# Patient Record
Sex: FEMALE | Race: OTHER | HISPANIC OR LATINO | URBAN - METROPOLITAN AREA
[De-identification: names, ages, dates, MRNs, and addresses within clinical notes are randomized per-mention and may not be internally consistent; named-entity substitution may affect disease eponyms.]

---

## 2022-12-14 ENCOUNTER — EMERGENCY (EMERGENCY)
Facility: HOSPITAL | Age: 22
LOS: 1 days | Discharge: HOME | End: 2022-12-14
Attending: STUDENT IN AN ORGANIZED HEALTH CARE EDUCATION/TRAINING PROGRAM | Admitting: STUDENT IN AN ORGANIZED HEALTH CARE EDUCATION/TRAINING PROGRAM

## 2022-12-14 VITALS
HEART RATE: 85 BPM | OXYGEN SATURATION: 100 % | RESPIRATION RATE: 16 BRPM | SYSTOLIC BLOOD PRESSURE: 114 MMHG | DIASTOLIC BLOOD PRESSURE: 78 MMHG | TEMPERATURE: 99 F

## 2022-12-14 DIAGNOSIS — F32.A DEPRESSION, UNSPECIFIED: ICD-10-CM

## 2022-12-14 PROCEDURE — 99284 EMERGENCY DEPT VISIT MOD MDM: CPT

## 2022-12-14 PROCEDURE — 90792 PSYCH DIAG EVAL W/MED SRVCS: CPT | Mod: GC
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
## 2022-12-14 NOTE — ED BEHAVIORAL HEALTH ASSESSMENT NOTE - DETAILS
mother - depression; father - depression/bipolar disorder; both have been hospitalized; maternal uncle-  by suicide before she was born rash from [employer] in chart discussed with ED team as per HPI has suicidal thoughts last week with no intent or plan, denies now
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
## 2022-12-14 NOTE — ED BEHAVIORAL HEALTH ASSESSMENT NOTE - RISK ASSESSMENT
Risk factors: +fam h/o suicidality, recent loss (bf), not receiving treatment, hopelessness/depressed mood    Protective factors: no current SIIP/HIIP, no h/o SA/SIB, no h/o psych admissions, no access to weapons, no active substance abuse, good physical health, no psychosis, engaged in work and school, domiciled, social supports, help-seeking behaviors    Overall, pt is a low risk of harm to self/others and does not meet criteria for psychiatric admission.

## 2022-12-14 NOTE — ED BEHAVIORAL HEALTH ASSESSMENT NOTE - NSBHMSEREMMEM_PSY_A_CORE
Alk phos 367, AST/ALT normal.  GGT wnl, unlikely to be liver origin.  Ddx includes malignancy with bone infiltration.  Has been up-trending since 9/19 from 136 > 287 >323 >367.   - Calcium normal  - Vit D,25-OH and PTH WNL. Normal

## 2022-12-14 NOTE — ED BEHAVIORAL HEALTH ASSESSMENT NOTE - NSBHMSETHTPROC_PSY_A_CORE
Please contact patient Saturday morning to make sure that he is doing better.
Linear/Normal reasoning
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
## 2022-12-14 NOTE — ED BEHAVIORAL HEALTH ASSESSMENT NOTE - DESCRIPTION
none lives with family, full-time student, works part-time at [employer], recent breakup with bf calm and cooperative in the ED    ICU Vital Signs Last 24 Hrs  T(C): 37.1 (14 Dec 2022 08:50), Max: 37.1 (14 Dec 2022 08:50)  T(F): 98.7 (14 Dec 2022 08:50), Max: 98.7 (14 Dec 2022 08:50)  HR: 85 (14 Dec 2022 08:50) (85 - 85)  BP: 114/78 (14 Dec 2022 08:50) (114/78 - 114/78)  BP(mean): --  ABP: --  ABP(mean): --  RR: 16 (14 Dec 2022 08:50) (16 - 16)  SpO2: 100% (14 Dec 2022 08:50) (100% - 100%)

## 2022-12-14 NOTE — ED PROVIDER NOTE - PATIENT PORTAL LINK FT
You can access the FollowMyHealth Patient Portal offered by [facility] by registering at the following website: [email or web address]. By joining [employer]’s FollowMyHealth portal, you will also be able to view your health information using other applications (apps) compatible with our system.

## 2022-12-14 NOTE — ED ADULT NURSE NOTE - OBJECTIVE STATEMENT
Pt brought in by dad for psych eval. Pt states she has a hx of bipolar not on meds. Pt states she has been dealing with a break up, states she is depressed and had suicidal ideation  a week ago. Deneis current SI, no plan.

## 2022-12-14 NOTE — ED BEHAVIORAL HEALTH ASSESSMENT NOTE - SUMMARY
Pt is a 23yo F, domiciled with family, student at [employer], recently out of relationship, PPH of ?bipolar disorder and MDD, previously on Rispderal and Zoloft five years ago, not currently on medications, no previous inpatient psych admissions, no previous suicide attempts, no legal history, no NSSIB, no PMH, presenting today with worsening depression in the context of recent breakup. Pt presenting with symptoms of a major depressive episode in the context of a recent breakup with boyfriend and life stressors, with previous suicidal thoughts but none currently and no intent/plan/planning and no previous suicide attempts, not currently in acute danger to self/others but wanting connected with outpatient resources to help with depressive symptoms. At this time, pt not an acute risk to self/others and does not require inpatient psychiatric admission, but would benefit from outpatient referrals - appointment made at [facility] crisis clinic for tomorrow morning (12/15/22) at 10:30AM    Plan  -discharge with appointment at [facility] crisis clinic tomorrow 12/15/22 at 10:30 AM

## 2022-12-14 NOTE — ED PROVIDER NOTE - PHYSICAL EXAMINATION
General: well appearing, interactive, well nourished, no apparent distress, ncat  HEENT: EOMI, PERRLA, normal mucosa, normal oropharynx, no lesions on the lips or on oral mucosa, normal external ear  Neck: supple, no lymphadenopathy, full range of motion, no nuchal rigidity  CV: well perfused   Resp: non labored breathing   Abd: non-distended  MSK: full range of motion, no cyanosis, no edema, no clubbing, no immobility  Neuro: CN II-XII grossly intact, muscle strength 5/5 in all extremities, normal gait  Skin: no rashes, skin intact   psych: cooperative
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
## 2022-12-14 NOTE — ED PROVIDER NOTE - NSFOLLOWUPINSTRUCTIONS_ED_ALL_ED_FT
1) Please follow up with your Primary Care Provider in 24-48 hours  2) Seek immediate medical care for any new or returning symptoms including but not limited thoughts of wanting to hurt yourself and/or others

## 2022-12-14 NOTE — ED BEHAVIORAL HEALTH ASSESSMENT NOTE - OTHER PAST PSYCHIATRIC HISTORY (INCLUDE DETAILS REGARDING ONSET, COURSE OF ILLNESS, INPATIENT/OUTPATIENT TREATMENT)
as per HPI; previous hx of bipolar disorder in [facility], on Zoloft and Risperdal but does not think diagnosis is correct and has not been on medications since [place] 17; no previous IP admission

## 2022-12-14 NOTE — ED BEHAVIORAL HEALTH ASSESSMENT NOTE - NSBHATTESTCOMMENTATTENDFT_PSY_A_CORE
Pt is a 23yo F, domiciled with family, student at [employer], recently out of relationship, PPH of ?bipolar disorder and MDD, previously on Rispderal and Zoloft five years ago, not currently on medications, no previous inpatient psych admissions, no previous suicide attempts, no legal history, no NSSIB, no PMH, presenting today with worsening depression in the context of recent breakup. Pt presenting with symptoms of a major depressive episode in the context of a recent breakup with boyfriend and life stressors, with previous suicidal thoughts but none currently and no intent/plan/planning and no previous suicide attempts, not currently in acute danger to self/others but wanting connected with outpatient resources to help with depressive symptoms. At this time, pt not an acute risk to self/others and does not require inpatient psychiatric admission.

## 2022-12-14 NOTE — ED PROVIDER NOTE - NS ED ROS FT
GENERAL: No fever or chills, //             EYES: no change in vision, //             HEENT: no trouble swallowing or speaking, //             CARDIAC: no chest pain, //              PULMONARY: no cough or SOB, //             GI: no abdominal pain, no nausea or no vomiting, no diarrhea or constipation, //             : No changes in urination,  //            SKIN: no rashes,  //            NEURO: no headache,  //             MSK: No joint pain otherwise as HPI or negative.   // psych: no hi ~Mehdi Castillo,

## 2022-12-14 NOTE — ED ADULT TRIAGE NOTE - CHIEF COMPLAINT QUOTE
Pt brought in by dad for psych eval. Pt states she has a hx of bipolar not on meds. Pt states she has been dealing with a break up, states she is depressed and suicidal x1 week with no plan

## 2022-12-15 ENCOUNTER — OUTPATIENT (OUTPATIENT)
Dept: OUTPATIENT SERVICES | Facility: HOSPITAL | Age: 22
LOS: 1 days | Discharge: STILL ADMITTED OR ADMINISTRATIVE | End: 2022-12-15
Payer: MEDICAID

## 2022-12-16 PROCEDURE — 90839 PSYTX CRISIS INITIAL 60 MIN: CPT | Mod: 95

## 2022-12-16 NOTE — ED BEHAVIORAL HEALTH NOTE - BEHAVIORAL HEALTH NOTE
High Risk Log; Writer called pt spoke to her who states she just got back from crisis clinic, was prescribed medication and will continue to follow up.

## 2023-01-06 PROCEDURE — 99214 OFFICE O/P EST MOD 30 MIN: CPT | Mod: 95

## 2023-01-10 DIAGNOSIS — F43.23 ADJUSTMENT DISORDER WITH MIXED ANXIETY AND DEPRESSED MOOD: ICD-10-CM

## 2023-01-17 ENCOUNTER — OUTPATIENT (OUTPATIENT)
Dept: OUTPATIENT SERVICES | Facility: HOSPITAL | Age: 23
LOS: 1 days | Discharge: HOME | End: 2023-01-17
Payer: MEDICAID

## 2023-01-18 DIAGNOSIS — F33.9 MAJOR DEPRESSIVE DISORDER, RECURRENT, UNSPECIFIED: ICD-10-CM

## 2024-09-13 PROCEDURE — 90836 PSYTX W PT W E/M 45 MIN: CPT

## 2024-09-13 PROCEDURE — 99214 OFFICE O/P EST MOD 30 MIN: CPT

## 2024-09-20 PROCEDURE — 99214 OFFICE O/P EST MOD 30 MIN: CPT

## 2024-09-20 PROCEDURE — 90836 PSYTX W PT W E/M 45 MIN: CPT

## 2024-09-23 PROCEDURE — 90836 PSYTX W PT W E/M 45 MIN: CPT

## 2024-09-23 PROCEDURE — 99214 OFFICE O/P EST MOD 30 MIN: CPT

## 2024-10-04 PROCEDURE — 90836 PSYTX W PT W E/M 45 MIN: CPT

## 2024-10-04 PROCEDURE — 99213 OFFICE O/P EST LOW 20 MIN: CPT

## 2024-10-09 PROCEDURE — 99214 OFFICE O/P EST MOD 30 MIN: CPT

## 2024-10-09 PROCEDURE — 90833 PSYTX W PT W E/M 30 MIN: CPT

## 2024-10-14 PROCEDURE — 90836 PSYTX W PT W E/M 45 MIN: CPT

## 2024-10-14 PROCEDURE — 99213 OFFICE O/P EST LOW 20 MIN: CPT

## 2024-10-21 PROCEDURE — 99213 OFFICE O/P EST LOW 20 MIN: CPT

## 2024-10-21 PROCEDURE — 90836 PSYTX W PT W E/M 45 MIN: CPT

## 2024-11-04 PROCEDURE — 90836 PSYTX W PT W E/M 45 MIN: CPT

## 2024-11-04 PROCEDURE — 99213 OFFICE O/P EST LOW 20 MIN: CPT

## 2024-11-07 ENCOUNTER — INPATIENT (INPATIENT)
Facility: HOSPITAL | Age: 24
LOS: 3 days | Discharge: HOME | End: 2024-11-11
Attending: STUDENT IN AN ORGANIZED HEALTH CARE EDUCATION/TRAINING PROGRAM | Admitting: STUDENT IN AN ORGANIZED HEALTH CARE EDUCATION/TRAINING PROGRAM
Payer: COMMERCIAL

## 2024-11-07 VITALS
HEART RATE: 94 BPM | SYSTOLIC BLOOD PRESSURE: 114 MMHG | TEMPERATURE: 98 F | DIASTOLIC BLOOD PRESSURE: 70 MMHG | RESPIRATION RATE: 16 BRPM | OXYGEN SATURATION: 97 % | WEIGHT: 169.98 LBS

## 2024-11-07 DIAGNOSIS — F10.90 ALCOHOL USE, UNSPECIFIED, UNCOMPLICATED: ICD-10-CM

## 2024-11-07 DIAGNOSIS — F32.9 MAJOR DEPRESSIVE DISORDER, SINGLE EPISODE, UNSPECIFIED: ICD-10-CM

## 2024-11-07 LAB
ALBUMIN SERPL ELPH-MCNC: 4.5 G/DL — SIGNIFICANT CHANGE UP (ref 3.3–5)
ALP SERPL-CCNC: 100 U/L — SIGNIFICANT CHANGE UP (ref 40–120)
ALT FLD-CCNC: 20 U/L — SIGNIFICANT CHANGE UP (ref 4–33)
ANION GAP SERPL CALC-SCNC: 15 MMOL/L — HIGH (ref 7–14)
APAP SERPL-MCNC: <10 UG/ML — LOW (ref 15–25)
AST SERPL-CCNC: 24 U/L — SIGNIFICANT CHANGE UP (ref 4–32)
BASOPHILS # BLD AUTO: 0.04 K/UL — SIGNIFICANT CHANGE UP (ref 0–0.2)
BASOPHILS NFR BLD AUTO: 0.6 % — SIGNIFICANT CHANGE UP (ref 0–2)
BILIRUB SERPL-MCNC: 0.2 MG/DL — SIGNIFICANT CHANGE UP (ref 0.2–1.2)
BUN SERPL-MCNC: 12 MG/DL — SIGNIFICANT CHANGE UP (ref 7–23)
CALCIUM SERPL-MCNC: 9.1 MG/DL — SIGNIFICANT CHANGE UP (ref 8.4–10.5)
CHLORIDE SERPL-SCNC: 103 MMOL/L — SIGNIFICANT CHANGE UP (ref 98–107)
CO2 SERPL-SCNC: 23 MMOL/L — SIGNIFICANT CHANGE UP (ref 22–31)
CREAT SERPL-MCNC: 0.78 MG/DL — SIGNIFICANT CHANGE UP (ref 0.5–1.3)
EGFR: 110 ML/MIN/1.73M2 — SIGNIFICANT CHANGE UP
EOSINOPHIL # BLD AUTO: 0.05 K/UL — SIGNIFICANT CHANGE UP (ref 0–0.5)
EOSINOPHIL NFR BLD AUTO: 0.7 % — SIGNIFICANT CHANGE UP (ref 0–6)
ETHANOL SERPL-MCNC: 134 MG/DL — HIGH
FLUAV AG NPH QL: SIGNIFICANT CHANGE UP
FLUBV AG NPH QL: SIGNIFICANT CHANGE UP
GLUCOSE SERPL-MCNC: 100 MG/DL — HIGH (ref 70–99)
HCG SERPL-ACNC: <1 MIU/ML — SIGNIFICANT CHANGE UP
HCT VFR BLD CALC: 36.7 % — SIGNIFICANT CHANGE UP (ref 34.5–45)
HGB BLD-MCNC: 12.4 G/DL — SIGNIFICANT CHANGE UP (ref 11.5–15.5)
IANC: 3.82 K/UL — SIGNIFICANT CHANGE UP (ref 1.8–7.4)
IMM GRANULOCYTES NFR BLD AUTO: 0.3 % — SIGNIFICANT CHANGE UP (ref 0–0.9)
LYMPHOCYTES # BLD AUTO: 2.32 K/UL — SIGNIFICANT CHANGE UP (ref 1–3.3)
LYMPHOCYTES # BLD AUTO: 33.5 % — SIGNIFICANT CHANGE UP (ref 13–44)
MCHC RBC-ENTMCNC: 30.2 PG — SIGNIFICANT CHANGE UP (ref 27–34)
MCHC RBC-ENTMCNC: 33.8 G/DL — SIGNIFICANT CHANGE UP (ref 32–36)
MCV RBC AUTO: 89.5 FL — SIGNIFICANT CHANGE UP (ref 80–100)
MONOCYTES # BLD AUTO: 0.67 K/UL — SIGNIFICANT CHANGE UP (ref 0–0.9)
MONOCYTES NFR BLD AUTO: 9.7 % — SIGNIFICANT CHANGE UP (ref 2–14)
NEUTROPHILS # BLD AUTO: 3.82 K/UL — SIGNIFICANT CHANGE UP (ref 1.8–7.4)
NEUTROPHILS NFR BLD AUTO: 55.2 % — SIGNIFICANT CHANGE UP (ref 43–77)
NRBC # BLD: 0 /100 WBCS — SIGNIFICANT CHANGE UP (ref 0–0)
NRBC # FLD: 0 K/UL — SIGNIFICANT CHANGE UP (ref 0–0)
PLATELET # BLD AUTO: 284 K/UL — SIGNIFICANT CHANGE UP (ref 150–400)
POTASSIUM SERPL-MCNC: 4.3 MMOL/L — SIGNIFICANT CHANGE UP (ref 3.5–5.3)
POTASSIUM SERPL-SCNC: 4.3 MMOL/L — SIGNIFICANT CHANGE UP (ref 3.5–5.3)
PROT SERPL-MCNC: 7.4 G/DL — SIGNIFICANT CHANGE UP (ref 6–8.3)
RBC # BLD: 4.1 M/UL — SIGNIFICANT CHANGE UP (ref 3.8–5.2)
RBC # FLD: 13.3 % — SIGNIFICANT CHANGE UP (ref 10.3–14.5)
RSV RNA NPH QL NAA+NON-PROBE: SIGNIFICANT CHANGE UP
SALICYLATES SERPL-MCNC: <0.3 MG/DL — LOW (ref 15–30)
SARS-COV-2 RNA SPEC QL NAA+PROBE: SIGNIFICANT CHANGE UP
SODIUM SERPL-SCNC: 141 MMOL/L — SIGNIFICANT CHANGE UP (ref 135–145)
TOXICOLOGY SCREEN, DRUGS OF ABUSE, SERUM RESULT: SIGNIFICANT CHANGE UP
TSH SERPL-MCNC: 2.93 UIU/ML — SIGNIFICANT CHANGE UP (ref 0.27–4.2)
WBC # BLD: 6.92 K/UL — SIGNIFICANT CHANGE UP (ref 3.8–10.5)
WBC # FLD AUTO: 6.92 K/UL — SIGNIFICANT CHANGE UP (ref 3.8–10.5)

## 2024-11-07 PROCEDURE — 99285 EMERGENCY DEPT VISIT HI MDM: CPT

## 2024-11-07 RX ORDER — HYDROXYZINE HCL 25 MG
25 TABLET ORAL EVERY 6 HOURS
Refills: 0 | Status: DISCONTINUED | OUTPATIENT
Start: 2024-11-07 | End: 2024-11-11

## 2024-11-07 RX ORDER — LORAZEPAM 2 MG
2 TABLET ORAL ONCE
Refills: 0 | Status: DISCONTINUED | OUTPATIENT
Start: 2024-11-07 | End: 2024-11-08

## 2024-11-07 RX ORDER — FLUOXETINE HCL 10 MG
40 CAPSULE ORAL DAILY
Refills: 0 | Status: DISCONTINUED | OUTPATIENT
Start: 2024-11-08 | End: 2024-11-11

## 2024-11-07 RX ORDER — LORAZEPAM 2 MG
2 TABLET ORAL
Refills: 0 | Status: DISCONTINUED | OUTPATIENT
Start: 2024-11-07 | End: 2024-11-11

## 2024-11-07 NOTE — ED BEHAVIORAL HEALTH ASSESSMENT NOTE - NSBHSAALC_PSY_A_CORE FT
drinks socially on weekends with friends drinks 3x per week- 2-4 drinks (1 shot of liquor in 8oz mixed drink)

## 2024-11-07 NOTE — ED PROVIDER NOTE - CONSTITUTIONAL, MLM
Patient notified and verbalized understanding. Patient had no questions or concerns at this time. Patient was transferred to PSR to schedule appointment with Dr. Aly    Closing encounter.    normal... Well appearing, awake, alert, oriented to person, place, time/situation and in no apparent distress.

## 2024-11-07 NOTE — ED PROVIDER NOTE - CLINICAL SUMMARY MEDICAL DECISION MAKING FREE TEXT BOX
25 y/o female with PMHx of Depression and SI who presented to the ED for worsening depression and SI today.   Given history and current presentation, concern for Depression with SI  Will get Psych eval and SW consult

## 2024-11-07 NOTE — ED ADULT NURSE NOTE - CHIEF COMPLAINT QUOTE
Patient came in with the complaints of suicidal ideation. Patient denies active plan. Patient stated she was admitted before for suicidal ideation in [facility]. Patient admits use of cocaine 2 hours prior to Arrival. Denies Hallucinations/Denies homicidal ideation. Denies any other past Medical history.

## 2024-11-07 NOTE — ED BEHAVIORAL HEALTH ASSESSMENT NOTE - VIOLENCE RISK FACTORS:
None Known Impulsivity/History of being victimized/traumatized/Community stressors that increase the risk of destabilization Substance abuse/History of being victimized/traumatized/Community stressors that increase the risk of destabilization

## 2024-11-07 NOTE — ED ADULT NURSE NOTE - OBJECTIVE STATEMENT
Patient came in with the complaints of suicidal ideation. Patient denies active plan. Patient stated she was admitted before for suicidal ideation in [facility]. Patient admits use of cocaine 2 hours prior to Arrival. Denies Hallucinations/Denies homicidal ideation. Denies any other past Medical history  Patient brought to  area for above complaints. Patient evaluated by medical provider. Labs drawn and sent. Patient evaluated by psychiatry. Patient is calm and cooperative. Will continue to monitor. Waiting for results and disposition.  WANDA Chester
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
## 2024-11-07 NOTE — ED BEHAVIORAL HEALTH ASSESSMENT NOTE - NSPRESENTSXS_PSY_ALL_CORE
Depressed mood/Anhedonia/Hopelessness or despair hypersomnia/Depressed mood/Anhedonia/Impulsivity/Hopelessness or despair
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
## 2024-11-07 NOTE — ED ADULT TRIAGE NOTE - CHIEF COMPLAINT QUOTE
Patient came in with the complaints of suicidal ideation. Patient denies active plan. Patient stated she was admitted before for suicidal ideation in [facility]. Patient admits use of cocaine 2 hours prior to Arrival. Denies Hallucinations/Denies homicidal ideation. Denies any other past Medical history.
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
## 2024-11-07 NOTE — ED BEHAVIORAL HEALTH ASSESSMENT NOTE - DESCRIPTION
none lives with family, full-time student, works part-time at [employer], recent breakup with bf calm and cooperative in the ED    ICU Vital Signs Last 24 Hrs  T(C): 37.1 (14 Dec 2022 08:50), Max: 37.1 (14 Dec 2022 08:50)  T(F): 98.7 (14 Dec 2022 08:50), Max: 98.7 (14 Dec 2022 08:50)  HR: 85 (14 Dec 2022 08:50) (85 - 85)  BP: 114/78 (14 Dec 2022 08:50) (114/78 - 114/78)  BP(mean): --  ABP: --  ABP(mean): --  RR: 16 (14 Dec 2022 08:50) (16 - 16)  SpO2: 100% (14 Dec 2022 08:50) (100% - 100%) During course of ED visit patient was calm and cooperative. Patient was not aggressive or violent and did not require PRN medications.     ICU Vital Signs Last 24 Hrs  T(C): 36.8 (07 Nov 2024 08:16), Max: 36.8 (07 Nov 2024 08:16)  T(F): 98.3 (07 Nov 2024 08:16), Max: 98.3 (07 Nov 2024 08:16)  HR: 94 (07 Nov 2024 08:16) (94 - 94)  BP: 114/70 (07 Nov 2024 08:16) (114/70 - 114/70)  BP(mean): --  ABP: --  ABP(mean): --  RR: 16 (07 Nov 2024 08:16) (16 - 16)  SpO2: 97% (07 Nov 2024 08:16) (97% - 97%)    O2 Parameters below as of 07 Nov 2024 08:16  Patient On (Oxygen Delivery Method): room air full time student at [employer],

## 2024-11-07 NOTE — ED BEHAVIORAL HEALTH ASSESSMENT NOTE - OTHER PAST PSYCHIATRIC HISTORY (INCLUDE DETAILS REGARDING ONSET, COURSE OF ILLNESS, INPATIENT/OUTPATIENT TREATMENT)
as per HPI; previous hx of bipolar disorder in [facility], on Zoloft and Risperdal but does not think diagnosis is correct and has not been on medications [place] 17; no previous IP admission P[facility] MDD. In AOPD at [facility] with Dr. Gale. Per chart review previous hx of bipolar disorder in [facility], on Zoloft and Risperdal . No history of inpatient admissions or suicide attempts.
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
## 2024-11-07 NOTE — ED BEHAVIORAL HEALTH ASSESSMENT NOTE - DETAILS
rash from Sulfur drugs discussed with ED team in chart as per HPI has suicidal thoughts last week with no intent or plan, denies now mother - depression; father - depression/bipolar disorder; both have been hospitalized; maternal uncle-  by suicide before she was born rash from Sulfa drugs see hpi bf 1S Dr. Crowell

## 2024-11-07 NOTE — ED BEHAVIORAL HEALTH NOTE - BEHAVIORAL HEALTH NOTE
Called TAVO CONTRERAS, patient's boyfriend, listed as her emergency contact.    He states yesterday patient was feeling happy and did not seem down at all. Last night at 2am patient got into a "really bad argument" with her father. Afterwards, she drove to the boyfriends house and was very upset. She did not tell him details of the argument. She told him she did not want to go home, wanted to go to the hospital, and "needed time alone". Patient told boyfriend if she went home she would hurt herself, so that is why she came into the hospital. He states she takes psychiatric medication, but does not know which medication or what psychiatric diagnosis she has.

## 2024-11-07 NOTE — BH PATIENT PROFILE - FALL HARM RISK - UNIVERSAL INTERVENTIONS
Bed in lowest position, wheels locked, appropriate side rails in place/Call bell, personal items and telephone in reach/Instruct patient to call for assistance before getting out of bed or chair/Non-slip footwear when patient is out of bed/[place] to call system/Physically safe environment - no spills, clutter or unnecessary equipment/Purposeful Proactive Rounding/Room/bathroom lighting operational, light cord in reach

## 2024-11-07 NOTE — ED BEHAVIORAL HEALTH ASSESSMENT NOTE - NSCURPASTPSYDX_PSY_ALL_CORE
Mood disorder Mood disorder/Alcohol/Substance Use disorders Mood disorder/Alcohol/Substance Use disorders/Cluster B Personality disorder/traits

## 2024-11-07 NOTE — ED BEHAVIORAL HEALTH ASSESSMENT NOTE - NSSUICPROTFACT_PSY_ALL_CORE
Responsibility to children, family, or others/Identifies reasons for living/Supportive social network of family or friends/Fear of death or the actual act of killing self/Engaged in work or school Identifies reasons for living/Engaged in work or school/Positive therapeutic relationships

## 2024-11-08 PROCEDURE — 99222 1ST HOSP IP/OBS MODERATE 55: CPT

## 2024-11-08 RX ORDER — ACETAMINOPHEN 500 MG
650 TABLET ORAL EVERY 6 HOURS
Refills: 0 | Status: DISCONTINUED | OUTPATIENT
Start: 2024-11-08 | End: 2024-11-11

## 2024-11-08 RX ORDER — MELATONIN 5 MG
3 TABLET ORAL AT BEDTIME
Refills: 0 | Status: DISCONTINUED | OUTPATIENT
Start: 2024-11-08 | End: 2024-11-11

## 2024-11-08 RX ORDER — LORAZEPAM 2 MG
1 TABLET ORAL ONCE
Refills: 0 | Status: DISCONTINUED | OUTPATIENT
Start: 2024-11-08 | End: 2024-11-11

## 2024-11-08 RX ORDER — MAGNESIUM, ALUMINUM HYDROXIDE 200-200 MG
30 TABLET,CHEWABLE ORAL EVERY 6 HOURS
Refills: 0 | Status: DISCONTINUED | OUTPATIENT
Start: 2024-11-08 | End: 2024-11-11

## 2024-11-08 RX ADMIN — Medication 40 MILLIGRAM(S): at 13:32

## 2024-11-08 RX ADMIN — Medication 3 MILLIGRAM(S): at 21:11
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
## 2024-11-08 NOTE — BH INPATIENT PSYCHIATRY ASSESSMENT NOTE - CURRENT MEDICATION
MEDICATIONS  (STANDING):  FLUoxetine 40 milliGRAM(s) Oral daily    MEDICATIONS  (PRN):  acetaminophen     Tablet .. 650 milliGRAM(s) Oral every 6 hours PRN Mild Pain (1 - 3), Moderate Pain (4 - 6)  aluminum hydroxide/magnesium hydroxide/simethicone Suspension 30 milliLiter(s) Oral every 6 hours PRN Dyspepsia  hydrOXYzine hydrochloride 25 milliGRAM(s) Oral every 6 hours PRN anxiety  LORazepam     Tablet 2 milliGRAM(s) Oral every 2 hours PRN CIWA score increase by 2 points and current CIWA score GREATER THAN 9  LORazepam   Injectable 1 milliGRAM(s) IntraMuscular once PRN Agitation  melatonin. 3 milliGRAM(s) Oral at bedtime PRN Insomnia
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
## 2024-11-08 NOTE — BH INPATIENT PSYCHIATRY ASSESSMENT NOTE - NSBHCHARTREVIEWVS_PSY_A_CORE FT
Vital Signs Last 24 Hrs  T(C): 36.5 (11-08-24 @ 20:19), Max: 36.5 (11-08-24 @ 08:10)  T(F): 97.7 (11-08-24 @ 20:19), Max: 97.7 (11-08-24 @ 08:10)  HR: --  BP: --  BP(mean): --  RR: --  SpO2: --    Orthostatic VS  11-08-24 @ 08:10  Lying BP: --/-- HR: --  Sitting BP: 110/61 HR: 72  Standing BP: 111/77 HR: 87  Site: --  Mode: --  Orthostatic VS  11-07-24 @ 12:56  Lying BP: --/-- HR: --  Sitting BP: 110/68 HR: 70  Standing BP: 112/70 HR: 74  Site: --  Mode: --

## 2024-11-08 NOTE — PSYCHIATRIC REHAB INITIAL EVALUATION - NSBHALCSUBCHOICE_PSY_ALL_CORE
Pt admitted she has been having few drinks per week.  Chart indicated, pt has been using ETOH and cocaine.
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
## 2024-11-08 NOTE — BH INPATIENT PSYCHIATRY ASSESSMENT NOTE - OTHER PAST PSYCHIATRIC HISTORY (INCLUDE DETAILS REGARDING ONSET, COURSE OF ILLNESS, INPATIENT/OUTPATIENT TREATMENT)
[facility] MDD. In AOPD at [facility] with Dr. Gale. Per chart review previous hx of bipolar disorder in [facility], on Zoloft and Risperdal . No history of inpatient admissions or suicide attempts.

## 2024-11-08 NOTE — PSYCHIATRIC REHAB INITIAL EVALUATION - NSBHSTRENGTHHOME_PSY_ALL_CORE
Pt stated she has been taking care of her hygiene, dose household chore. Pt reported she has good rapport with her mom and her boyfriend.

## 2024-11-08 NOTE — BH INPATIENT PSYCHIATRY ASSESSMENT NOTE - ADDITIONAL DETAILS ALL
CC:  Prince Molina is here today for office visit and establish care (new patient).    Medications: medications verified and updated  Refills needed today?  NO  Patient would like communication of their results via:   [facility]  Advanced directives: No          Health Maintenance Due   Topic Date Due   • Depression Screening  Never done   • DTaP/Tdap/Td Vaccine (1 - Tdap) Never done   • Shingles Vaccine (1 of 2) Never done   • Colorectal Cancer Screen-  Never done   • Influenza Vaccine (1) 09/01/2021     Patient is due for topics listed above, he wishes to proceed with Depression Screening , but is not proceeding with Immunization(s) Dtap/Tdap/Td, Influenza and Shingles and Colorectal Cancer Screening: Colonoscopy at this time. The following has occurred: to speak to provider.         Over the last 2 weeks, how often have you been bothered by the following problems?          PHQ2 Score: 0  PHQ2 Score Interpretation: No further screening needed  1. Little interest or pleasure in activity?: 0  2. Feeling down, depressed, or hopeless?: 0         DEPRESSION ASSESSMENT/PLAN:  Depression screening is negative no further plan needed.       denies

## 2024-11-08 NOTE — BH PSYCHOLOGY - CLINICIAN PSYCHOTHERAPY NOTE - TOKEN PULL-DIAGNOSIS
Primary Diagnosis:    Problem Dx:   Alcohol use disorder [F10.90]      MDD (major depressive disorder) [F32.9]

## 2024-11-08 NOTE — BH SAFETY PLAN - ENVIRONMENT SAFETY 3:
Talk to my mom  Work on managing my anger (communicating my anger, going outside and breathing when angry)

## 2024-11-08 NOTE — BH INPATIENT PSYCHIATRY ASSESSMENT NOTE - NSBHMETABOLIC_PSY_ALL_CORE_FT
BMI:   HbA1c:   Glucose:   BP: 102/58 (11-07-24 @ 12:19) (102/58 - 114/70)Vital Signs Last 24 Hrs  T(C): 36.5 (11-08-24 @ 20:19), Max: 36.5 (11-08-24 @ 08:10)  T(F): 97.7 (11-08-24 @ 20:19), Max: 97.7 (11-08-24 @ 08:10)  HR: --  BP: --  BP(mean): --  RR: --  SpO2: --    Orthostatic VS  11-08-24 @ 08:10  Lying BP: --/-- HR: --  Sitting BP: 110/61 HR: 72  Standing BP: 111/77 HR: 87  Site: --  Mode: --  Orthostatic VS  11-07-24 @ 12:56  Lying BP: --/-- HR: --  Sitting BP: 110/68 HR: 70  Standing BP: 112/70 HR: 74  Site: --  Mode: --    Lipid Panel:

## 2024-11-08 NOTE — BH SAFETY PLAN - LOCAL URGENT CARE NAME
[facility]  [facility]

## 2024-11-08 NOTE — BH SAFETY PLAN - LOCAL URGENT CARE ADDRESS
[phone] [place] [address], [place] [address] [phone] [place] [address], [place] [address]

## 2024-11-08 NOTE — BH INPATIENT PSYCHIATRY ASSESSMENT NOTE - DETAILS
see hpi mother - depression; father - depression/bipolar disorder; both have been hospitalized; maternal uncle-  by suicide before she was born rash from Sulf[employer] drugs as per HPI
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
## 2024-11-08 NOTE — BH TREATMENT PLAN - NSTXDEPRESINTERPR_PSY_ALL_CORE
Pt would benefit from identifying 2-3 coping skills to improve mood by day seven. Psychiatric rehabilitation staff has encouraged pt to attend daily symptom management group and participated in individual therapy session to achieve her goal.

## 2024-11-08 NOTE — BH SAFETY PLAN - THE ONE THING THAT IS MOST IMPORTANT TO ME AND WORTH LIVING FOR IS:
Graduate, get my master degree, eventually get my own apartment  My pets  My family, friends, and boyfriend  My degree  Getting my own apartment  Travel  Meeting more benson
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
## 2024-11-09 LAB
APPEARANCE UR: ABNORMAL
BACTERIA # UR AUTO: ABNORMAL /HPF
BILIRUB UR-MCNC: NEGATIVE — SIGNIFICANT CHANGE UP
CAST: 0 /LPF — SIGNIFICANT CHANGE UP (ref 0–4)
COLOR SPEC: YELLOW — SIGNIFICANT CHANGE UP
DIFF PNL FLD: NEGATIVE — SIGNIFICANT CHANGE UP
GLUCOSE UR QL: NEGATIVE MG/DL — SIGNIFICANT CHANGE UP
KETONES UR-MCNC: NEGATIVE MG/DL — SIGNIFICANT CHANGE UP
LEUKOCYTE ESTERASE UR-ACNC: ABNORMAL
NITRITE UR-MCNC: POSITIVE
PH UR: 7 — SIGNIFICANT CHANGE UP (ref 5–8)
PROT UR-MCNC: SIGNIFICANT CHANGE UP MG/DL
RBC CASTS # UR COMP ASSIST: 1 /HPF — SIGNIFICANT CHANGE UP (ref 0–4)
REVIEW: SIGNIFICANT CHANGE UP
SP GR SPEC: 1.02 — SIGNIFICANT CHANGE UP (ref 1–1.03)
SQUAMOUS # UR AUTO: 10 /HPF — HIGH (ref 0–5)
UROBILINOGEN FLD QL: 1 MG/DL — SIGNIFICANT CHANGE UP (ref 0.2–1)
WBC UR QL: 8 /HPF — HIGH (ref 0–5)

## 2024-11-09 PROCEDURE — 99231 SBSQ HOSP IP/OBS SF/LOW 25: CPT

## 2024-11-09 RX ADMIN — Medication 40 MILLIGRAM(S): at 15:07

## 2024-11-09 RX ADMIN — Medication 3 MILLIGRAM(S): at 21:27

## 2024-11-10 PROCEDURE — 99231 SBSQ HOSP IP/OBS SF/LOW 25: CPT

## 2024-11-10 RX ORDER — NITROFURANTOIN 25 MG/5ML
100 SUSPENSION ORAL
Refills: 0 | Status: DISCONTINUED | OUTPATIENT
Start: 2024-11-10 | End: 2024-11-11

## 2024-11-10 RX ORDER — MELATONIN 5 MG
1 TABLET ORAL
Qty: 0 | Refills: 0 | DISCHARGE
Start: 2024-11-10

## 2024-11-10 RX ORDER — FLUOXETINE HCL 10 MG
1 CAPSULE ORAL
Refills: 0 | DISCHARGE

## 2024-11-10 RX ORDER — FLUOXETINE HCL 10 MG
1 CAPSULE ORAL
Qty: 30 | Refills: 0
Start: 2024-11-10 | End: 2024-12-09

## 2024-11-10 RX ADMIN — NITROFURANTOIN 100 MILLIGRAM(S): 25 SUSPENSION ORAL at 21:09

## 2024-11-10 RX ADMIN — Medication 40 MILLIGRAM(S): at 10:08

## 2024-11-10 RX ADMIN — Medication 3 MILLIGRAM(S): at 21:57

## 2024-11-10 NOTE — BH INPATIENT PSYCHIATRY PROGRESS NOTE - NSBHFUPINTERVALCCFT_PSY_A_CORE
"I'm feeling better and motivated for my future"		
"I had a nice visit with my BF yesterday.. He says I need to come home soon because my cats are getting fat !"

## 2024-11-10 NOTE — BH INPATIENT PSYCHIATRY PROGRESS NOTE - NSBHFUPINTERVALHXFT_PSY_A_CORE
Patient seen for follow up of depression and SI  Chart reviewed and case discussed with Nursing Staff  Patient has an active 72 hour notice  Reports plan not to go home but to her BF of a few years who has been caring for her cats "Eric" and "Fiona" while she has been here  Denies any further suicidal ideation, intention or plan 
Patient seen for follow up of depression and SI  Chart reviewed and case discussed with Nursing Staff  Patient has an active 72 hour notice  Reports a great visit with her BF where she plans to go after her D/C  Feels she has benefited from a short time here  Denies any further suicidal ideation, intention or plan

## 2024-11-10 NOTE — BH INPATIENT PSYCHIATRY PROGRESS NOTE - CURRENT MEDICATION
MEDICATIONS  (STANDING):  FLUoxetine 40 milliGRAM(s) Oral daily    MEDICATIONS  (PRN):  acetaminophen     Tablet .. 650 milliGRAM(s) Oral every 6 hours PRN Mild Pain (1 - 3), Moderate Pain (4 - 6)  aluminum hydroxide/magnesium hydroxide/simethicone Suspension 30 milliLiter(s) Oral every 6 hours PRN Dyspepsia  hydrOXYzine hydrochloride 25 milliGRAM(s) Oral every 6 hours PRN anxiety  LORazepam     Tablet 2 milliGRAM(s) Oral every 2 hours PRN CIWA score increase by 2 points and current CIWA score GREATER THAN 9  LORazepam   Injectable 1 milliGRAM(s) IntraMuscular once PRN Agitation  melatonin. 3 milliGRAM(s) Oral at bedtime PRN Insomnia  
MEDICATIONS  (STANDING):  FLUoxetine 40 milliGRAM(s) Oral daily    MEDICATIONS  (PRN):  acetaminophen     Tablet .. 650 milliGRAM(s) Oral every 6 hours PRN Mild Pain (1 - 3), Moderate Pain (4 - 6)  aluminum hydroxide/magnesium hydroxide/simethicone Suspension 30 milliLiter(s) Oral every 6 hours PRN Dyspepsia  hydrOXYzine hydrochloride 25 milliGRAM(s) Oral every 6 hours PRN anxiety  LORazepam     Tablet 2 milliGRAM(s) Oral every 2 hours PRN CIWA score increase by 2 points and current CIWA score GREATER THAN 9  LORazepam   Injectable 1 milliGRAM(s) IntraMuscular once PRN Agitation  melatonin. 3 milliGRAM(s) Oral at bedtime PRN Insomnia

## 2024-11-10 NOTE — BH INPATIENT PSYCHIATRY DISCHARGE NOTE - HOSPITAL COURSE
Patient admitted to [address] [place] [address] 11/7/24 on a voluntary status  Submitted a 72 hour notice on 11/8 and discharged on that letter on 11/11/2024    Patient after admission quickly reported a flight into health and submitted a 72 hour notice   She was observed over 72 hours and was in good behavioral control and denies all hopelessness and suicidal ideation     On exam today the patient is generally cooperative and makes fair eye contact.   Speech is clear and of normal rate.  Thought process: with no disorder of thought process.   Thought content: with no evidence of delusional beliefs.   Perception: Denies hallucinations.  Mood: Describes as "improved"   Affect: flat.  Patient denies suicidal and aggressive ideation, intent and plan.   AAO X3. Cognitively grossly intact.   Insight and judgment are improved.  Impulse control is intact at this time    Suicide and risk assessment performed prior to discharge. The patient has a low acute risk and low chronic risk of self-harm and aggression towards others. Protective factors include denying SI, no SIB, denying HI, good social supports in their family, no current substance abuse, no current mood symptoms, no hopelessness, future-oriented in returning to home, no access to firearms.  Risk factors include presenting illness. Immediate risk was minimized by inpatient admission to a safe environment with appropriate supervision and limited access to lethal means. Future risk was minimized before discharge by treatment of acute episode, maximizing outpatient support, providing relevant patient education, discussing emergency procedures, and ensuring close follow-up. The patient remains at a low risk of self-harm, and such risk cannot be further ameliorated by continued inpatient treatment and the patient is therefore appropriate for discharge.       There were no behavioral problems on the unit.  Patient did not become agitated and did not require emergent intramuscular medications or seclusion / restraints.  Patient did not self-harm on the unit.  Patient remained actively engaged in treatment.  Patient participated in individual, group, and milieu therapy.  Patient got along appropriately with staff and peers. Patient did not have any medical problems during this hospitalization.  There were no medical consultations.    A full discussion of the factors that predict treatment success and relapse was held including safety planning.  A discussion of the risks and benefits of patient’s medication was held including a discussion of the risk of worsening of SI    On day of discharge, the patient no longer requires inpatient treatment and care. Patient denies all suicidal and aggressive ideation, intent and plan. Patient denies anxiety symptoms and panic attacks. Patient is not judged to be an acute danger to self or others at this time. Patient will be discharged on her 72 hour notice to home and outpatient follow up.
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
## 2024-11-10 NOTE — BH INPATIENT PSYCHIATRY DISCHARGE NOTE - MSE UNSTRUCTURED FT
On exam today the patient is generally cooperative with fair eye contact.    Speech is clear and of normal rate.    Thought process: with no evidence of a disorder of thought process.    Thought content: with no evidence of psychotic beliefs.  Perception: Denies hallucinations.    Mood: Describes as "better".  Affect: constricted.    Patient denies suicidal ideation, active intent and plan.    Patient denies active aggressive/homicidal ideation, intent or plan.   Patient is Alert and oriented in all spheres. Patient is cognitively grossly intact.   Insight and judgment are limited. Impulse control is intact at this time.    Vital signs are stable. Gait and station WNL
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
## 2024-11-10 NOTE — BH INPATIENT PSYCHIATRY DISCHARGE NOTE - NSDCMRMEDTOKEN_GEN_ALL_CORE_FT
PROzac 40 mg oral capsule: 1 cap(s) orally once a day   FLUoxetine 40 mg oral capsule: 1 cap(s) orally once a day  melatonin 3 mg oral tablet: 1 tab(s) orally once a day (at bedtime) As needed Insomnia   FLUoxetine 40 mg oral capsule: 1 cap(s) orally once a day  melatonin 3 mg oral tablet: 1 tab(s) orally once a day (at bedtime) As needed Insomnia  nitrofurantoin macrocrystals-monohydrate 100 mg oral capsule: 1 cap(s) orally 2 times a day

## 2024-11-10 NOTE — BH INPATIENT PSYCHIATRY DISCHARGE NOTE - OTHER PAST PSYCHIATRIC HISTORY (INCLUDE DETAILS REGARDING ONSET, COURSE OF ILLNESS, INPATIENT/OUTPATIENT TREATMENT)
PPH MDD. In AOPD at [facility] with Dr. Gale. Per chart review previous hx of bipolar disorder in [facility], on Zoloft and Risperdal . No history of inpatient admissions or suicide attempts.

## 2024-11-10 NOTE — BH INPATIENT PSYCHIATRY PROGRESS NOTE - NSBHMETABOLIC_PSY_ALL_CORE_FT
BMI:   HbA1c:   Glucose:   BP: --Vital Signs Last 24 Hrs  T(C): 36.6 (11-10-24 @ 08:07), Max: 36.6 (11-09-24 @ 21:05)  T(F): 97.9 (11-10-24 @ 08:07), Max: 97.9 (11-10-24 @ 08:07)  HR: --  BP: --  BP(mean): --  RR: 18 (11-10-24 @ 08:07) (18 - 18)  SpO2: --    Orthostatic VS  11-10-24 @ 08:07  Lying BP: --/-- HR: --  Sitting BP: 100/676 HR: 61  Standing BP: 111/74 HR: 67  Site: upper right arm  Mode: electronic  Orthostatic VS  11-09-24 @ 08:18  Lying BP: --/-- HR: --  Sitting BP: 121/77 HR: 65  Standing BP: 127/82 HR: 65  Site: --  Mode: --    Lipid Panel: 
BMI:   HbA1c:   Glucose:   BP: 102/58 (11-07-24 @ 12:19) (102/58 - 114/70)Vital Signs Last 24 Hrs  T(C): 36.4 (11-09-24 @ 08:18), Max: 36.5 (11-08-24 @ 20:19)  T(F): 97.6 (11-09-24 @ 08:18), Max: 97.7 (11-08-24 @ 20:19)  HR: --  BP: --  BP(mean): --  RR: 17 (11-09-24 @ 08:18) (17 - 17)  SpO2: --    Orthostatic VS  11-09-24 @ 08:18  Lying BP: --/-- HR: --  Sitting BP: 121/77 HR: 65  Standing BP: 127/82 HR: 65  Site: --  Mode: --  Orthostatic VS  11-08-24 @ 08:10  Lying BP: --/-- HR: --  Sitting BP: 110/61 HR: 72  Standing BP: 111/77 HR: 87  Site: --  Mode: --    Lipid Panel:

## 2024-11-10 NOTE — BH INPATIENT PSYCHIATRY DISCHARGE NOTE - NSBHFUPINTERVALHXFT_PSY_A_CORE
Patient seen for follow up of depression and SI and discharge day management   Chart reviewed and case discussed with Nursing Staff  Patient has an active 72 hour notice and will be discharged today  Reports feeling better and will be picked up by father but living with her BF after her D/C  Feels she has benefited from a short time here  Denies any further suicidal ideation, intention or plan

## 2024-11-10 NOTE — BH INPATIENT PSYCHIATRY DISCHARGE NOTE - NSBHMETABOLIC_PSY_ALL_CORE_FT
BMI:   HbA1c:   Glucose:   BP: --Vital Signs Last 24 Hrs  T(C): 36.6 (11-10-24 @ 20:32), Max: 36.6 (11-09-24 @ 21:05)  T(F): 97.9 (11-10-24 @ 20:32), Max: 97.9 (11-10-24 @ 08:07)  HR: --  BP: --  BP(mean): --  RR: 18 (11-10-24 @ 08:07) (18 - 18)  SpO2: --    Orthostatic VS  11-10-24 @ 08:07  Lying BP: --/-- HR: --  Sitting BP: 100/676 HR: 61  Standing BP: 111/74 HR: 67  Site: upper right arm  Mode: electronic  Orthostatic VS  11-09-24 @ 08:18  Lying BP: --/-- HR: --  Sitting BP: 121/77 HR: 65  Standing BP: 127/82 HR: 65  Site: --  Mode: --    Lipid Panel:

## 2024-11-10 NOTE — BH INPATIENT PSYCHIATRY DISCHARGE NOTE - NSDCCPCAREPLAN_GEN_ALL_CORE_FT
PRINCIPAL DISCHARGE DIAGNOSIS  Diagnosis: Major depression  Assessment and Plan of Treatment:       SECONDARY DISCHARGE DIAGNOSES  Diagnosis: Alcohol use disorder  Assessment and Plan of Treatment:

## 2024-11-10 NOTE — BH INPATIENT PSYCHIATRY PROGRESS NOTE - PRN MEDS
MEDICATIONS  (PRN):  acetaminophen     Tablet .. 650 milliGRAM(s) Oral every 6 hours PRN Mild Pain (1 - 3), Moderate Pain (4 - 6)  aluminum hydroxide/magnesium hydroxide/simethicone Suspension 30 milliLiter(s) Oral every 6 hours PRN Dyspepsia  hydrOXYzine hydrochloride 25 milliGRAM(s) Oral every 6 hours PRN anxiety  LORazepam     Tablet 2 milliGRAM(s) Oral every 2 hours PRN CIWA score increase by 2 points and current CIWA score GREATER THAN 9  LORazepam   Injectable 1 milliGRAM(s) IntraMuscular once PRN Agitation  melatonin. 3 milliGRAM(s) Oral at bedtime PRN Insomnia  
MEDICATIONS  (PRN):  acetaminophen     Tablet .. 650 milliGRAM(s) Oral every 6 hours PRN Mild Pain (1 - 3), Moderate Pain (4 - 6)  aluminum hydroxide/magnesium hydroxide/simethicone Suspension 30 milliLiter(s) Oral every 6 hours PRN Dyspepsia  hydrOXYzine hydrochloride 25 milliGRAM(s) Oral every 6 hours PRN anxiety  LORazepam     Tablet 2 milliGRAM(s) Oral every 2 hours PRN CIWA score increase by 2 points and current CIWA score GREATER THAN 9  LORazepam   Injectable 1 milliGRAM(s) IntraMuscular once PRN Agitation  melatonin. 3 milliGRAM(s) Oral at bedtime PRN Insomnia

## 2024-11-10 NOTE — BH INPATIENT PSYCHIATRY PROGRESS NOTE - NSBHATTESTBILLING_PSY_A_CORE
49339-Mbzblswxnx OBS or IP - low complexity OR 25-34 mins
01034-Jilahvstmm OBS or IP - low complexity OR 25-34 mins

## 2024-11-10 NOTE — BH INPATIENT PSYCHIATRY PROGRESS NOTE - MSE UNSTRUCTURED FT
On exam today the patient is generally cooperative with fair eye contact.    Speech is clear and of normal rate.    Thought process: with no evidence of a disorder of thought process.    Thought content: with no evidence of psychotic beliefs.  Perception: Denies hallucinations.    Mood: Describes as "less depressed".  Affect: constricted.    Patient denies suicidal ideation, active intent and plan.    Patient denies active aggressive/homicidal ideation, intent or plan.   Patient is Alert and oriented in all spheres. Patient is cognitively grossly intact.   Insight and judgment are limited. Impulse control is intact at this time.    Vital signs are stable. Gait and station WNL  
On exam today the patient is generally cooperative with fair eye contact.    Speech is clear and of normal rate.    Thought process: with no evidence of a disorder of thought process.    Thought content: with no evidence of psychotic beliefs.  Perception: Denies hallucinations.    Mood: Describes as "better".  Affect: constricted.    Patient denies suicidal ideation, active intent and plan.    Patient denies active aggressive/homicidal ideation, intent or plan.   Patient is Alert and oriented in all spheres. Patient is cognitively grossly intact.   Insight and judgment are limited. Impulse control is intact at this time.    Vital signs are stable. Gait and station WNL
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
## 2024-11-10 NOTE — BH INPATIENT PSYCHIATRY DISCHARGE NOTE - DETAILS
mother - depression; father - depression/bipolar disorder; both have been hospitalized; maternal uncle-  by suicide before she was born as per HPI

## 2024-11-11 VITALS — TEMPERATURE: 98 F

## 2024-11-11 PROCEDURE — 90853 GROUP PSYCHOTHERAPY: CPT

## 2024-11-11 RX ORDER — NITROFURANTOIN 25 MG/5ML
1 SUSPENSION ORAL
Qty: 8 | Refills: 0
Start: 2024-11-11 | End: 2024-11-14

## 2024-11-11 RX ADMIN — Medication 40 MILLIGRAM(S): at 08:38

## 2024-11-11 RX ADMIN — NITROFURANTOIN 100 MILLIGRAM(S): 25 SUSPENSION ORAL at 08:38

## 2024-11-11 NOTE — BH DISCHARGE NOTE NURSING/SOCIAL WORK/PSYCH REHAB - DISCHARGE INSTRUCTIONS AFTERCARE APPOINTMENTS
In order to check the location, date, or time of your aftercare appointment, please refer to your Discharge Instructions Document given to you upon leaving the hospital.  If you have lost the instructions please call [phone]

## 2024-11-11 NOTE — BH DISCHARGE NOTE NURSING/SOCIAL WORK/PSYCH REHAB - NSCDUDCCRISIS_PSY_A_CORE
[facility]  1 [phone]) [facility]-WELL ([phone])  Text "WELL" to 22465  Website: www.[employer]/.National Suicide Prevention Lifeline 8 (518) 318-6953/.  [employer]  1 (077) [employer] (153-2864)/.  [facility]  [address] [address] [phone]   Hours:  Monday through Friday from 9 AM to 3 PM/988 Suicide and Crisis Lifeline

## 2024-11-11 NOTE — BH PSYCHOLOGY - CLINICIAN PSYCHOTHERAPY NOTE - TOKEN PULL-DIAGNOSIS
Primary Diagnosis:  MDD (major depressive disorder) [F32.9]        Problem Dx:   Alcohol use disorder [F10.90]      MDD (major depressive disorder) [F32.9]

## 2024-11-11 NOTE — BH PSYCHOLOGY - GROUP THERAPY NOTE - NSBHPSYCHOLGRPNUM_PSY_A_CORE
Quality 402: Tobacco Use And Help With Quitting Among Adolescents: Patient screened for tobacco and never smoked
Detail Level: Detailed
Quality 130: Documentation Of Current Medications In The Medical Record: Current Medications Documented
Quality 110: Preventive Care And Screening: Influenza Immunization: Influenza Immunization Administered during Influenza season
Quality 431: Preventive Care And Screening: Unhealthy Alcohol Use - Screening: Patient identified as an unhealthy alcohol user when screened for unhealthy alcohol use using a systematic screening method and received brief counseling
Quality 111:Pneumonia Vaccination Status For Older Adults: Pneumococcal vaccine (PPSV23) administered on or after patient’s 60th birthday and before the end of the measurement period
12

## 2024-11-11 NOTE — BH DISCHARGE NOTE NURSING/SOCIAL WORK/PSYCH REHAB - PATIENT PORTAL LINK FT
You can access the FollowMyHealth Patient Portal offered by [facility] by registering at the following website: [email or web address]. By joining [employer]’s FollowMyHealth portal, you will also be able to view your health information using other applications (apps) compatible with our system.

## 2024-11-11 NOTE — BH PSYCHOLOGY - GROUP THERAPY NOTE - NSPSYCHOLGRPDBTGOAL_PSY_A_CORE
reduce mood and affective lability/reduce impulsive self-defeating behavior/improve ability to indentify feelings/improve ability to communicate feelings/reduce vulnerability to emotional dysregualation/promote skills to reduce anger

## 2024-11-11 NOTE — BH PSYCHOLOGY - GROUP THERAPY NOTE - NSPSYCHOLGRPDBTPT_PSY_A_CORE FT
DBT Group is a group in which patients learn skills to better manage their emotions and behaviors. Group begins with a mindfulness practice so that patients have an opportunity to practice observing their internal and external experiences.  Following the mindfulness exercise the group learns new skills in a didactic format. Pts were taught the concept of “wise mind,” which is about identifying different states of mind (emotional vs. reasonable mind) and finding ways to tap into wise mind which is a blend of the two, and the state of mind that is consistent with wise decision making and long term goals and values.
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
## 2024-11-11 NOTE — BH DISCHARGE NOTE NURSING/SOCIAL WORK/PSYCH REHAB - NSDCPRGOAL_PSY_ALL_CORE
Patient met specified goal of identifying 2 coping skills. Progress was evidenced by patient's ability to identify listening to music and cleaning as coping skills. Patient attended groups and was appropriate with peers. Patient was visible on the milieu. Patient completed a safety plan upon discharge.